# Patient Record
Sex: FEMALE | Race: WHITE
[De-identification: names, ages, dates, MRNs, and addresses within clinical notes are randomized per-mention and may not be internally consistent; named-entity substitution may affect disease eponyms.]

---

## 2021-10-06 ENCOUNTER — HOSPITAL ENCOUNTER (INPATIENT)
Dept: HOSPITAL 95 - ER | Age: 66
LOS: 2 days | Discharge: HOME | DRG: 379 | End: 2021-10-08
Attending: FAMILY MEDICINE | Admitting: HOSPITALIST
Payer: OTHER GOVERNMENT

## 2021-10-06 VITALS — HEIGHT: 71 IN | WEIGHT: 170.86 LBS | BODY MASS INDEX: 23.92 KG/M2

## 2021-10-06 DIAGNOSIS — E78.5: ICD-10-CM

## 2021-10-06 DIAGNOSIS — D12.4: ICD-10-CM

## 2021-10-06 DIAGNOSIS — K21.9: ICD-10-CM

## 2021-10-06 DIAGNOSIS — M19.90: ICD-10-CM

## 2021-10-06 DIAGNOSIS — Z79.02: ICD-10-CM

## 2021-10-06 DIAGNOSIS — E86.0: ICD-10-CM

## 2021-10-06 DIAGNOSIS — Z79.899: ICD-10-CM

## 2021-10-06 DIAGNOSIS — D12.3: ICD-10-CM

## 2021-10-06 DIAGNOSIS — I25.10: ICD-10-CM

## 2021-10-06 DIAGNOSIS — Z98.890: ICD-10-CM

## 2021-10-06 DIAGNOSIS — Z87.19: ICD-10-CM

## 2021-10-06 DIAGNOSIS — Z88.8: ICD-10-CM

## 2021-10-06 DIAGNOSIS — Z90.49: ICD-10-CM

## 2021-10-06 DIAGNOSIS — Z20.822: ICD-10-CM

## 2021-10-06 DIAGNOSIS — K57.33: Primary | ICD-10-CM

## 2021-10-06 DIAGNOSIS — K64.8: ICD-10-CM

## 2021-10-06 LAB
ALBUMIN SERPL BCP-MCNC: 3.3 G/DL (ref 3.4–5)
ALBUMIN/GLOB SERPL: 0.9 {RATIO} (ref 0.8–1.8)
ALT SERPL W P-5'-P-CCNC: 21 U/L (ref 12–78)
ANION GAP SERPL CALCULATED.4IONS-SCNC: 8 MMOL/L (ref 6–16)
AST SERPL W P-5'-P-CCNC: 19 U/L (ref 12–37)
BASOPHILS # BLD AUTO: 0.05 K/MM3 (ref 0–0.23)
BASOPHILS NFR BLD AUTO: 1 % (ref 0–2)
BILIRUB SERPL-MCNC: 0.5 MG/DL (ref 0.1–1)
BUN SERPL-MCNC: 20 MG/DL (ref 8–24)
CALCIUM SERPL-MCNC: 8.9 MG/DL (ref 8.5–10.1)
CHLORIDE SERPL-SCNC: 107 MMOL/L (ref 98–108)
CO2 SERPL-SCNC: 25 MMOL/L (ref 21–32)
CREAT SERPL-MCNC: 0.98 MG/DL (ref 0.4–1)
DEPRECATED RDW RBC AUTO: 41.5 FL (ref 35.1–46.3)
EOSINOPHIL # BLD AUTO: 0.05 K/MM3 (ref 0–0.68)
EOSINOPHIL NFR BLD AUTO: 1 % (ref 0–6)
ERYTHROCYTE [DISTWIDTH] IN BLOOD BY AUTOMATED COUNT: 12.7 % (ref 11.7–14.2)
GLOBULIN SER CALC-MCNC: 3.5 G/DL (ref 2.2–4)
GLUCOSE SERPL-MCNC: 145 MG/DL (ref 70–99)
HCT VFR BLD AUTO: 21.8 % (ref 33–51)
HCT VFR BLD AUTO: 24.6 % (ref 33–51)
HCT VFR BLD AUTO: 31.5 % (ref 33–51)
HGB BLD-MCNC: 10.4 G/DL (ref 11.5–16)
HGB BLD-MCNC: 7.4 G/DL (ref 11.5–16)
HGB BLD-MCNC: 8.6 G/DL (ref 11.5–16)
IMM GRANULOCYTES # BLD AUTO: 0.02 K/MM3 (ref 0–0.1)
IMM GRANULOCYTES NFR BLD AUTO: 0 % (ref 0–1)
LYMPHOCYTES # BLD AUTO: 1.52 K/MM3 (ref 0.84–5.2)
LYMPHOCYTES NFR BLD AUTO: 20 % (ref 21–46)
MCHC RBC AUTO-ENTMCNC: 33 G/DL (ref 31.5–36.5)
MCV RBC AUTO: 90 FL (ref 80–100)
MONOCYTES # BLD AUTO: 0.43 K/MM3 (ref 0.16–1.47)
MONOCYTES NFR BLD AUTO: 6 % (ref 4–13)
NEUTROPHILS # BLD AUTO: 5.59 K/MM3 (ref 1.96–9.15)
NEUTROPHILS NFR BLD AUTO: 73 % (ref 41–73)
NRBC # BLD AUTO: 0 K/MM3 (ref 0–0.02)
NRBC BLD AUTO-RTO: 0 /100 WBC (ref 0–0.2)
PLATELET # BLD AUTO: 240 K/MM3 (ref 150–400)
POTASSIUM SERPL-SCNC: 3.6 MMOL/L (ref 3.5–5.5)
PROT SERPL-MCNC: 6.8 G/DL (ref 6.4–8.2)
SARS-COV-2 RNA RESP QL NAA+PROBE: NEGATIVE
SODIUM SERPL-SCNC: 140 MMOL/L (ref 136–145)

## 2021-10-06 PROCEDURE — U0004 COV-19 TEST NON-CDC HGH THRU: HCPCS

## 2021-10-06 PROCEDURE — A9270 NON-COVERED ITEM OR SERVICE: HCPCS

## 2021-10-06 PROCEDURE — C9113 INJ PANTOPRAZOLE SODIUM, VIA: HCPCS

## 2021-10-06 PROCEDURE — P9016 RBC LEUKOCYTES REDUCED: HCPCS

## 2021-10-07 LAB
ANION GAP SERPL CALCULATED.4IONS-SCNC: 6 MMOL/L (ref 6–16)
BUN SERPL-MCNC: 18 MG/DL (ref 8–24)
CALCIUM SERPL-MCNC: 7.7 MG/DL (ref 8.5–10.1)
CHLORIDE SERPL-SCNC: 113 MMOL/L (ref 98–108)
CO2 SERPL-SCNC: 23 MMOL/L (ref 21–32)
CREAT SERPL-MCNC: 0.92 MG/DL (ref 0.4–1)
DEPRECATED RDW RBC AUTO: 42.6 FL (ref 35.1–46.3)
ERYTHROCYTE [DISTWIDTH] IN BLOOD BY AUTOMATED COUNT: 13 % (ref 11.7–14.2)
GLUCOSE SERPL-MCNC: 116 MG/DL (ref 70–99)
HCT VFR BLD AUTO: 20.3 % (ref 33–51)
HCT VFR BLD AUTO: 21.4 % (ref 33–51)
HGB BLD-MCNC: 6.6 G/DL (ref 11.5–16)
HGB BLD-MCNC: 7.3 G/DL (ref 11.5–16)
MCHC RBC AUTO-ENTMCNC: 32.5 G/DL (ref 31.5–36.5)
MCV RBC AUTO: 91 FL (ref 80–100)
NRBC # BLD AUTO: 0 K/MM3 (ref 0–0.02)
NRBC BLD AUTO-RTO: 0 /100 WBC (ref 0–0.2)
PLATELET # BLD AUTO: 182 K/MM3 (ref 150–400)
POTASSIUM SERPL-SCNC: 4 MMOL/L (ref 3.5–5.5)
SODIUM SERPL-SCNC: 142 MMOL/L (ref 136–145)

## 2021-10-07 PROCEDURE — 30233N1 TRANSFUSION OF NONAUTOLOGOUS RED BLOOD CELLS INTO PERIPHERAL VEIN, PERCUTANEOUS APPROACH: ICD-10-PCS | Performed by: INTERNAL MEDICINE

## 2021-10-07 PROCEDURE — 0DBM8ZZ EXCISION OF DESCENDING COLON, VIA NATURAL OR ARTIFICIAL OPENING ENDOSCOPIC: ICD-10-PCS | Performed by: INTERNAL MEDICINE

## 2021-10-07 PROCEDURE — 0W3P8ZZ CONTROL BLEEDING IN GASTROINTESTINAL TRACT, VIA NATURAL OR ARTIFICIAL OPENING ENDOSCOPIC: ICD-10-PCS | Performed by: INTERNAL MEDICINE

## 2021-10-07 PROCEDURE — 0DBL8ZZ EXCISION OF TRANSVERSE COLON, VIA NATURAL OR ARTIFICIAL OPENING ENDOSCOPIC: ICD-10-PCS | Performed by: INTERNAL MEDICINE

## 2021-10-07 NOTE — NUR
10/07/21 1826 Yuval Karimi
PATIENT DETERMINED TO BE ASA APPROPRIATE FOR PROPOFOL SEDATION PRIOR
TO START OF PROCEDURE BY DR. CHRISTIANSEN. 3-LEAD EKG REVIEWED WITH PHYSICIAN
PRIOR TO START OF PROCEDURE. Patient to ENDO 1. History, Chart,
Medications and Allergies reviewed before start of procedure. MONITOR
INTACT WITH CONTINUOUS PULSE OXIMETRY AND INTERMITTENT BP. O2 VIA N/C
INTACT THROUGHOUT SEDATION/PROCEDURE.

## 2021-10-07 NOTE — NUR
SHIFT SUMMARY
 
PATIENT IS RESTING IN BED COMFORTABLY. BED IS IN LOW POSITION. CALL LIGHT IS
IN REACH. PATIENT IS ON ROOM AIR SATURATING WELL. VITALS HAVE BEEN STABLE. NO
COMPLAINTES OF PAIN. BEDREST FOR NOW DUE TO SYNCOPAL EPISODE. SHE IS CURRENTLY
GETING BLOOD DUE TO A HGB OF 6.6. NO ACUTE EVENTS DURING THE NIGHT. NO BLOOD
BOWEL MOVEMENTS DURING THE NIGHT. WILL CONTINUE TO MONITOR. REPORT GIVEN TO
DAY SHIFT RN.

## 2021-10-07 NOTE — NUR
DR GONZALEZ WAS NOTIFED THE PATIENT'S HGB IS 6.6 AND HCT IS 20.3 HCT. SHE ORDED
1 UNIT OF BLOOD. WILL CONTINUE TO MONITOR.

## 2021-10-08 LAB
ALBUMIN SERPL BCP-MCNC: 2.4 G/DL (ref 3.4–5)
ALBUMIN/GLOB SERPL: 0.9 {RATIO} (ref 0.8–1.8)
ALT SERPL W P-5'-P-CCNC: 12 U/L (ref 12–78)
ANION GAP SERPL CALCULATED.4IONS-SCNC: 4 MMOL/L (ref 6–16)
AST SERPL W P-5'-P-CCNC: 14 U/L (ref 12–37)
BASOPHILS # BLD AUTO: 0.03 K/MM3 (ref 0–0.23)
BASOPHILS NFR BLD AUTO: 1 % (ref 0–2)
BILIRUB SERPL-MCNC: 0.4 MG/DL (ref 0.1–1)
BUN SERPL-MCNC: 10 MG/DL (ref 8–24)
CALCIUM SERPL-MCNC: 7.8 MG/DL (ref 8.5–10.1)
CHLORIDE SERPL-SCNC: 116 MMOL/L (ref 98–108)
CO2 SERPL-SCNC: 26 MMOL/L (ref 21–32)
CREAT SERPL-MCNC: 0.9 MG/DL (ref 0.4–1)
DEPRECATED RDW RBC AUTO: 43.7 FL (ref 35.1–46.3)
EOSINOPHIL # BLD AUTO: 0.06 K/MM3 (ref 0–0.68)
EOSINOPHIL NFR BLD AUTO: 1 % (ref 0–6)
ERYTHROCYTE [DISTWIDTH] IN BLOOD BY AUTOMATED COUNT: 13.2 % (ref 11.7–14.2)
GLOBULIN SER CALC-MCNC: 2.6 G/DL (ref 2.2–4)
GLUCOSE SERPL-MCNC: 94 MG/DL (ref 70–99)
HCT VFR BLD AUTO: 20.7 % (ref 33–51)
HCT VFR BLD AUTO: 25.9 % (ref 33–51)
HGB BLD-MCNC: 6.9 G/DL (ref 11.5–16)
HGB BLD-MCNC: 8.6 G/DL (ref 11.5–16)
IMM GRANULOCYTES # BLD AUTO: 0.04 K/MM3 (ref 0–0.1)
IMM GRANULOCYTES NFR BLD AUTO: 1 % (ref 0–1)
LYMPHOCYTES # BLD AUTO: 1.53 K/MM3 (ref 0.84–5.2)
LYMPHOCYTES NFR BLD AUTO: 27 % (ref 21–46)
MCHC RBC AUTO-ENTMCNC: 33.3 G/DL (ref 31.5–36.5)
MCV RBC AUTO: 92 FL (ref 80–100)
MONOCYTES # BLD AUTO: 0.48 K/MM3 (ref 0.16–1.47)
MONOCYTES NFR BLD AUTO: 8 % (ref 4–13)
NEUTROPHILS # BLD AUTO: 3.61 K/MM3 (ref 1.96–9.15)
NEUTROPHILS NFR BLD AUTO: 63 % (ref 41–73)
NRBC # BLD AUTO: 0 K/MM3 (ref 0–0.02)
NRBC BLD AUTO-RTO: 0 /100 WBC (ref 0–0.2)
PLATELET # BLD AUTO: 151 K/MM3 (ref 150–400)
POTASSIUM SERPL-SCNC: 3.7 MMOL/L (ref 3.5–5.5)
PROT SERPL-MCNC: 5 G/DL (ref 6.4–8.2)
SODIUM SERPL-SCNC: 146 MMOL/L (ref 136–145)

## 2021-10-08 NOTE — NUR
DR GONZALEZ WAS CALLED [facility] THE PATIENT'S HGB WAS 6.9 AND HCT WAS 20.7. 1
UNIT OF BLOOD WAS ORDERED. WILL CONTINUE TO MONITOR.

## 2021-10-08 NOTE — NUR
SHIFT SUMMARY
 
PATIENT IS RESTING IN BED COMFORTABLY. BED IS IN LOW POSITION. CALL LIGHT IS
IN REACH. PATIENT DID VERY WELL DURING THE NIGHT. SHE WAS ABLE TO AMBUALTE TO
THE BATHROOM WITH NOT PROBLEM. VITALS WERE STABLE DURING THE NIGHT. NO BLOODY
STOOL AND NAUSEA/VOMITING. PATIENT IS CURRENLTY GETTING 1 UNIT OF BLOOD FOR A
HGB OF 6.9 AND TOLERATING IT WELL. WILL CONTINUE TO MONITOR. REPORT GIVENT O
DAY SHIFT RN.

## 2023-07-28 ENCOUNTER — HOSPITAL ENCOUNTER (OUTPATIENT)
Dept: HOSPITAL 95 - LAB | Age: 68
Discharge: HOME | End: 2023-07-28
Attending: EMERGENCY MEDICINE
Payer: MEDICARE

## 2023-07-28 DIAGNOSIS — R10.32: Primary | ICD-10-CM

## 2023-07-28 LAB
ALBUMIN SERPL BCP-MCNC: 4.4 G/DL (ref 3.4–5)
ALBUMIN/GLOB SERPL: 1.2 {RATIO} (ref 0.8–1.8)
ALT SERPL W P-5'-P-CCNC: 19 U/L (ref 12–78)
ANION GAP SERPL CALCULATED.4IONS-SCNC: 9 MMOL/L (ref 6–16)
AST SERPL W P-5'-P-CCNC: 17 U/L (ref 12–37)
BASOPHILS # BLD AUTO: 0.04 K/MM3 (ref 0–0.23)
BASOPHILS NFR BLD AUTO: 1 % (ref 0–2)
BILIRUB SERPL-MCNC: 1 MG/DL (ref 0.1–1)
BUN SERPL-MCNC: 10 MG/DL (ref 8–24)
CALCIUM SERPL-MCNC: 10 MG/DL (ref 8.5–10.1)
CHLORIDE SERPL-SCNC: 102 MMOL/L (ref 98–108)
CO2 SERPL-SCNC: 29 MMOL/L (ref 21–32)
CREAT SERPL-MCNC: 0.91 MG/DL (ref 0.4–1)
DEPRECATED RDW RBC AUTO: 42 FL (ref 35.1–46.3)
EOSINOPHIL # BLD AUTO: 0.09 K/MM3 (ref 0–0.68)
EOSINOPHIL NFR BLD AUTO: 1 % (ref 0–6)
ERYTHROCYTE [DISTWIDTH] IN BLOOD BY AUTOMATED COUNT: 12.7 % (ref 11.7–14.2)
GLOBULIN SER CALC-MCNC: 3.8 G/DL (ref 2.2–4)
GLUCOSE SERPL-MCNC: 108 MG/DL (ref 70–99)
HCT VFR BLD AUTO: 41.4 % (ref 33–51)
HGB BLD-MCNC: 14.1 G/DL (ref 11.5–16)
IMM GRANULOCYTES # BLD AUTO: 0.02 K/MM3 (ref 0–0.1)
IMM GRANULOCYTES NFR BLD AUTO: 0 % (ref 0–1)
LYMPHOCYTES # BLD AUTO: 1.16 K/MM3 (ref 0.84–5.2)
LYMPHOCYTES NFR BLD AUTO: 14 % (ref 21–46)
MCHC RBC AUTO-ENTMCNC: 34.1 G/DL (ref 31.5–36.5)
MCV RBC AUTO: 90 FL (ref 80–100)
MONOCYTES # BLD AUTO: 0.44 K/MM3 (ref 0.16–1.47)
MONOCYTES NFR BLD AUTO: 5 % (ref 4–13)
NEUTROPHILS # BLD AUTO: 6.38 K/MM3 (ref 1.96–9.15)
NEUTROPHILS NFR BLD AUTO: 79 % (ref 41–73)
NRBC # BLD AUTO: 0 K/MM3 (ref 0–0.02)
NRBC BLD AUTO-RTO: 0 /100 WBC (ref 0–0.2)
PLATELET # BLD AUTO: 202 K/MM3 (ref 150–400)
POTASSIUM SERPL-SCNC: 4.3 MMOL/L (ref 3.5–5.5)
PROT SERPL-MCNC: 8.2 G/DL (ref 6.4–8.2)
SODIUM SERPL-SCNC: 140 MMOL/L (ref 136–145)

## 2025-07-29 ENCOUNTER — HOSPITAL ENCOUNTER (EMERGENCY)
Dept: HOSPITAL 95 - ER | Age: 70
Discharge: HOME | End: 2025-07-29
Payer: MEDICARE

## 2025-07-29 VITALS — DIASTOLIC BLOOD PRESSURE: 85 MMHG | SYSTOLIC BLOOD PRESSURE: 157 MMHG

## 2025-07-29 VITALS — HEIGHT: 71 IN | WEIGHT: 139 LBS | BODY MASS INDEX: 19.46 KG/M2

## 2025-07-29 DIAGNOSIS — E78.5: ICD-10-CM

## 2025-07-29 DIAGNOSIS — I25.10: ICD-10-CM

## 2025-07-29 DIAGNOSIS — Z79.899: ICD-10-CM

## 2025-07-29 DIAGNOSIS — K52.9: ICD-10-CM

## 2025-07-29 DIAGNOSIS — K21.9: ICD-10-CM

## 2025-07-29 DIAGNOSIS — Z88.8: ICD-10-CM

## 2025-07-29 DIAGNOSIS — M25.512: Primary | ICD-10-CM

## 2025-07-29 DIAGNOSIS — R73.03: ICD-10-CM

## 2025-07-29 LAB
ALBUMIN SERPL BCP-MCNC: 4.1 G/DL (ref 3.4–5)
ALBUMIN/GLOB SERPL: 1.1 {RATIO} (ref 0.8–1.8)
ALP SERPL-CCNC: 99 U/L (ref 50–136)
ALT SERPL W P-5'-P-CCNC: 21 U/L (ref 12–78)
ANION GAP SERPL CALCULATED.4IONS-SCNC: 8 MMOL/L (ref 3–11)
AST SERPL W P-5'-P-CCNC: 19 U/L (ref 12–37)
BASOPHILS # BLD AUTO: 0.05 K/MM3 (ref 0–0.23)
BASOPHILS # BLD: (no result) K/MM3 (ref 0–0.23)
BASOPHILS NFR BLD AUTO: 1 % (ref 0–2)
BASOPHILS NFR BLD: (no result) % (ref 0–2)
BILIRUB SERPL-MCNC: 0.5 MG/DL (ref 0.1–1)
BLASTS NFR BLD MANUAL: (no result) % (ref 0–0)
BUN SERPL-MCNC: 12 MG/DL (ref 8–24)
BUN/CREAT SERPL: 14.3 % (ref 12–20)
CALCIUM SERPL-MCNC: 9.4 MG/DL (ref 8.5–10.1)
CHLORIDE SERPL-SCNC: 107 MMOL/L (ref 98–108)
CO2 SERPL-SCNC: 26 MMOL/L (ref 21–32)
CREAT SERPL-MCNC: 0.84 MG/DL (ref 0.4–1.2)
DEPRECATED RDW RBC AUTO: 43.3 FL (ref 35.1–46.3)
EOSINOPHIL # BLD AUTO: 0.09 K/MM3 (ref 0–0.68)
EOSINOPHIL # BLD: (no result) K/MM3 (ref 0–0.68)
EOSINOPHIL NFR BLD AUTO: 2 % (ref 0–6)
EOSINOPHIL NFR BLD: (no result) % (ref 0–6)
ERYTHROCYTE [DISTWIDTH] IN BLOOD BY AUTOMATED COUNT: 12.7 % (ref 11.7–14.2)
GFR SERPL CREATININE-BSD FRML MDRD: (no result) ML/MIN/{1.73_M2} (ref 60–?)
GLOBULIN SER CALC-MCNC: 3.6 G/DL (ref 2.2–4)
GLUCOSE SERPL-MCNC: 94 MG/DL (ref 70–99)
HCT VFR BLD AUTO: 39.7 % (ref 33–53)
HGB BLD-MCNC: 13.2 G/DL (ref 11.5–17.5)
IMM GRANULOCYTES # BLD AUTO: 0 K/MM3 (ref 0–0.1)
IMM GRANULOCYTES NFR BLD AUTO: 0 % (ref 0–1)
LYMPHOCYTES # BLD AUTO: 1.78 K/MM3 (ref 0.84–5.2)
LYMPHOCYTES # BLD: (no result) K/MM3 (ref 0.84–5.2)
LYMPHOCYTES NFR BLD AUTO: 35 % (ref 21–46)
LYMPHOCYTES NFR BLD: (no result) % (ref 21–46)
MCH RBC QN AUTO: 30.8 PG (ref 26–34)
MCHC RBC AUTO-ENTMCNC: 33.2 G/DL (ref 31.5–36.5)
MCV RBC AUTO: 93 FL (ref 80–100)
METAMYELOCYTES # BLD MANUAL: (no result) K/MM3 (ref 0–0)
METAMYELOCYTES NFR BLD MANUAL: (no result) % (ref 0–0)
MONOCYTES # BLD AUTO: 0.41 K/MM3 (ref 0.16–1.47)
MONOCYTES # BLD: (no result) K/MM3 (ref 0.16–1.47)
MONOCYTES NFR BLD AUTO: 8 % (ref 4–13)
MONOCYTES NFR BLD: (no result) % (ref 4–13)
MYELOCYTES # BLD MANUAL: (no result) K/MM3 (ref 0–0)
MYELOCYTES NFR BLD MANUAL: (no result) % (ref 0–0)
NEUTROPHILS # BLD AUTO: 2.75 K/MM3 (ref 1.96–9.15)
NEUTROPHILS NFR BLD AUTO: 54 % (ref 41–73)
NEUTS BAND NFR BLD MANUAL: (no result) % (ref 0–8)
NEUTS SEG # BLD MANUAL: (no result) K/MM3 (ref 1.96–9.15)
NEUTS SEG NFR BLD MANUAL: (no result) % (ref 41–73)
NRBC # BLD AUTO: 0 K/MM3 (ref 0–0.02)
NRBC BLD AUTO-RTO: 0 /100 WBC (ref 0–0.2)
PLASMA CELLS # BLD MANUAL: (no result) K/MM3 (ref 0–0)
PLASMA CELLS NFR BLD: (no result) % (ref 0–0)
PLATELET # BLD AUTO: 220 K/MM3 (ref 150–400)
PMV BLD AUTO: 10.2 FL (ref 9.1–12.4)
POTASSIUM SERPL-SCNC: 3.7 MMOL/L (ref 3.5–5.5)
PROMYELOCYTES # BLD MANUAL: (no result) K/MM3 (ref 0–0)
PROMYELOCYTES NFR BLD MANUAL: (no result) % (ref 0–0)
PROT SERPL-MCNC: 7.7 G/DL (ref 6.4–8.2)
RBC # BLD AUTO: 4.29 M/MM3 (ref 3.8–5.9)
SODIUM SERPL-SCNC: 137 MMOL/L (ref 136–145)
TOTAL CELLS COUNTED BLD: (no result)
VARIANT LYMPHS NFR BLD MANUAL: (no result) % (ref 0–0)
WBC # BLD AUTO: 5.08 K/MM3 (ref 4–11.3)
WBC OTHER NFR BLD MANUAL: (no result) % (ref 0–0)